# Patient Record
Sex: FEMALE | URBAN - METROPOLITAN AREA
[De-identification: names, ages, dates, MRNs, and addresses within clinical notes are randomized per-mention and may not be internally consistent; named-entity substitution may affect disease eponyms.]

---

## 2019-01-08 ENCOUNTER — OFFICE (OUTPATIENT)
Dept: URBAN - METROPOLITAN AREA CLINIC 66 | Facility: CLINIC | Age: 29
End: 2019-01-08

## 2019-01-08 VITALS
HEIGHT: 59 IN | WEIGHT: 90 LBS | SYSTOLIC BLOOD PRESSURE: 110 MMHG | DIASTOLIC BLOOD PRESSURE: 76 MMHG | HEART RATE: 72 BPM

## 2019-01-08 DIAGNOSIS — K21.9 GASTRO-ESOPHAGEAL REFLUX DISEASE WITHOUT ESOPHAGITIS: ICD-10-CM

## 2019-01-08 DIAGNOSIS — K30 FUNCTIONAL DYSPEPSIA: ICD-10-CM

## 2019-01-08 DIAGNOSIS — R10.13 EPIGASTRIC PAIN: ICD-10-CM

## 2019-01-08 PROCEDURE — 99242 OFF/OP CONSLTJ NEW/EST SF 20: CPT | Performed by: INTERNAL MEDICINE

## 2021-01-04 ENCOUNTER — IMMUNIZATION (OUTPATIENT)
Dept: VACCINE CLINIC | Facility: HOSPITAL | Age: 31
End: 2021-01-04

## 2021-01-04 PROCEDURE — 91300 HC SARSCOV02 VAC 30MCG/0.3ML IM: CPT | Performed by: INTERNAL MEDICINE

## 2021-01-04 PROCEDURE — 0001A: CPT | Performed by: INTERNAL MEDICINE

## 2021-01-25 ENCOUNTER — IMMUNIZATION (OUTPATIENT)
Dept: VACCINE CLINIC | Facility: HOSPITAL | Age: 31
End: 2021-01-25

## 2021-01-25 PROCEDURE — 91300 HC SARSCOV02 VAC 30MCG/0.3ML IM: CPT | Performed by: INTERNAL MEDICINE

## 2021-01-25 PROCEDURE — 0002A: CPT | Performed by: INTERNAL MEDICINE

## 2021-04-02 ENCOUNTER — TRANSCRIBE ORDERS (OUTPATIENT)
Dept: ADMINISTRATIVE | Facility: HOSPITAL | Age: 31
End: 2021-04-02

## 2021-04-02 ENCOUNTER — LAB (OUTPATIENT)
Dept: LAB | Facility: HOSPITAL | Age: 31
End: 2021-04-02

## 2021-04-02 DIAGNOSIS — Z83.49 FAMILY HISTORY OF ENDOCRINE AND METABOLIC DISEASE: ICD-10-CM

## 2021-04-02 DIAGNOSIS — Z00.00 ROUTINE GENERAL MEDICAL EXAMINATION AT A HEALTH CARE FACILITY: ICD-10-CM

## 2021-04-02 DIAGNOSIS — Z00.00 ROUTINE GENERAL MEDICAL EXAMINATION AT A HEALTH CARE FACILITY: Primary | ICD-10-CM

## 2021-04-02 LAB
ALBUMIN SERPL-MCNC: 4.3 G/DL (ref 3.5–5.2)
ALBUMIN/GLOB SERPL: 1.2 G/DL
ALP SERPL-CCNC: 54 U/L (ref 39–117)
ALT SERPL W P-5'-P-CCNC: 27 U/L (ref 1–33)
ANION GAP SERPL CALCULATED.3IONS-SCNC: 10.5 MMOL/L (ref 5–15)
AST SERPL-CCNC: 24 U/L (ref 1–32)
BILIRUB SERPL-MCNC: 0.2 MG/DL (ref 0–1.2)
BUN SERPL-MCNC: 14 MG/DL (ref 6–20)
BUN/CREAT SERPL: 24.6 (ref 7–25)
CALCIUM SPEC-SCNC: 9 MG/DL (ref 8.6–10.5)
CHLORIDE SERPL-SCNC: 104 MMOL/L (ref 98–107)
CHOLEST SERPL-MCNC: 159 MG/DL (ref 0–200)
CO2 SERPL-SCNC: 20.5 MMOL/L (ref 22–29)
CREAT SERPL-MCNC: 0.57 MG/DL (ref 0.57–1)
GFR SERPL CREATININE-BSD FRML MDRD: 125 ML/MIN/1.73
GFR SERPL CREATININE-BSD FRML MDRD: >150 ML/MIN/1.73
GLOBULIN UR ELPH-MCNC: 3.5 GM/DL
GLUCOSE SERPL-MCNC: 82 MG/DL (ref 65–99)
HDLC SERPL-MCNC: 41 MG/DL (ref 40–60)
LDLC SERPL CALC-MCNC: 101 MG/DL (ref 0–100)
LDLC/HDLC SERPL: 2.44 {RATIO}
POTASSIUM SERPL-SCNC: 4.4 MMOL/L (ref 3.5–5.2)
PROT SERPL-MCNC: 7.8 G/DL (ref 6–8.5)
SODIUM SERPL-SCNC: 135 MMOL/L (ref 136–145)
TRIGL SERPL-MCNC: 89 MG/DL (ref 0–150)
TSH SERPL DL<=0.05 MIU/L-ACNC: 1.72 UIU/ML (ref 0.27–4.2)
VLDLC SERPL-MCNC: 17 MG/DL (ref 5–40)

## 2021-04-02 PROCEDURE — 84443 ASSAY THYROID STIM HORMONE: CPT

## 2021-04-02 PROCEDURE — 80053 COMPREHEN METABOLIC PANEL: CPT

## 2021-04-02 PROCEDURE — 80061 LIPID PANEL: CPT

## 2021-04-02 PROCEDURE — 36415 COLL VENOUS BLD VENIPUNCTURE: CPT

## 2024-06-25 ENCOUNTER — OFFICE VISIT (OUTPATIENT)
Dept: FAMILY MEDICINE CLINIC | Facility: CLINIC | Age: 34
End: 2024-06-25
Payer: COMMERCIAL

## 2024-06-25 VITALS
RESPIRATION RATE: 16 BRPM | DIASTOLIC BLOOD PRESSURE: 82 MMHG | SYSTOLIC BLOOD PRESSURE: 106 MMHG | OXYGEN SATURATION: 99 % | TEMPERATURE: 98.2 F | HEIGHT: 59 IN | HEART RATE: 72 BPM | BODY MASS INDEX: 20.84 KG/M2 | WEIGHT: 103.4 LBS

## 2024-06-25 DIAGNOSIS — H57.9 ITCHY EYES: ICD-10-CM

## 2024-06-25 DIAGNOSIS — Z11.3 SCREEN FOR STD (SEXUALLY TRANSMITTED DISEASE): Primary | ICD-10-CM

## 2024-06-25 PROCEDURE — 99203 OFFICE O/P NEW LOW 30 MIN: CPT | Performed by: FAMILY MEDICINE

## 2024-06-25 RX ORDER — DIPHENOXYLATE HYDROCHLORIDE AND ATROPINE SULFATE 2.5; .025 MG/1; MG/1
TABLET ORAL
COMMUNITY

## 2024-06-25 RX ORDER — KETOTIFEN FUMARATE 0.35 MG/ML
1 SOLUTION/ DROPS OPHTHALMIC 2 TIMES DAILY
Qty: 10 ML | Refills: 1 | Status: SHIPPED | OUTPATIENT
Start: 2024-06-25

## 2024-06-25 NOTE — PROGRESS NOTES
Subjective     Annette Jaffe is a 33 y.o. female.     Chief Complaint   Patient presents with    Establish [facility]    STD test    eye itching     X 3 weeks both eyes.        History of Present Illness     To establish care, discuss the followings ;      Works WITH      She is allergic to tylenol and Ibu  Was advised to take PPI before Ibu     She is sexually active   Had sex with new partner   Using condom   Has regular cycle   Not on BC    C/o Itchy eyes with No discharge          The following portions of the patient's history were reviewed and updated as appropriate: allergies, current medications, past family history, past medical history, past social history, past surgical history, and problem list.        Review of Systems   Eyes:  Positive for itching. Negative for discharge.       Vitals:    06/25/24 1558   BP: 106/82   Pulse: 72   Resp: 16   Temp: 98.2 °F (36.8 °C)   SpO2: 99%           06/25/24  1558   Weight: 46.9 kg (103 lb 6.4 oz)         Body mass index is 20.88 kg/m².      Current Outpatient Medications   Medication Sig Dispense Refill    multivitamin (THERAGRAN) tablet tablet Take  by mouth.      Ketotifen Fumarate (ZADITOR) 0.035 % solution Administer 1 drop to both eyes 2 (Two) Times a Day. 10 mL 1     No current facility-administered medications for this visit.                Objective   Physical Exam  Vitals and nursing note reviewed.   Constitutional:       General: She is not in acute distress.     Appearance: She is not toxic-appearing.   Eyes:      Extraocular Movements: Extraocular movements intact.      Conjunctiva/sclera: Conjunctivae normal.      Pupils: Pupils are equal, round, and reactive to light.   Skin:     General: Skin is warm.   Neurological:      Mental Status: She is alert and oriented to person, place, and time.   Psychiatric:         Mood and Affect: Mood normal.         Behavior: Behavior normal.         Thought Content: Thought content normal.           Assessment & Plan    Diagnoses and all orders for this visit:    1. Screen for STD (sexually transmitted disease) (Primary)  -     Chlamydia trachomatis, Neisseria gonorrhoeae, Trichomonas vaginalis, PCR - Urine, Urine, Clean Catch; Future  -     HIV-1 / O / 2 Ag / Antibody; Future  -     RPR; Future    2. Itchy eyes  -     Ketotifen Fumarate (ZADITOR) 0.035 % solution; Administer 1 drop to both eyes 2 (Two) Times a Day.  Dispense: 10 mL; Refill: 1           I spent 33 minutes caring for this patient on this date of service. This time includes time spent by me in the following activities:preparing for the visit,reviewing previous medical records,  performing a medically appropriate examination and/or evaluation, counseling and educating the patient/family/caregiver, and documenting information in the medical record.         Patient was given instructions and counseling regarding her condition or for health maintenance advice. Please see specific information pulled into the AVS if appropriate.     I have fully discussed the nature of the medical condition(s) risks, complications, management, safe and proper use of medications.   Encouraged medication compliance and the importance of keeping scheduled follow up appointments with me and any other providers.    Patient instructed to follow up with our office for results on any labs/imaging ordered during this visit.    Home care discussed  All questions answered  Patient verbalizes understanding and agrees to treatment plan.     Follow up: Return for WILL CALL YOU FOR LBAS/XR RESULT.

## 2024-06-27 ENCOUNTER — LAB (OUTPATIENT)
Dept: LAB | Facility: HOSPITAL | Age: 34
End: 2024-06-27
Payer: COMMERCIAL

## 2024-06-27 PROCEDURE — G0432 EIA HIV-1/HIV-2 SCREEN: HCPCS | Performed by: FAMILY MEDICINE

## 2024-06-27 PROCEDURE — 86592 SYPHILIS TEST NON-TREP QUAL: CPT | Performed by: FAMILY MEDICINE

## 2024-06-27 PROCEDURE — 87661 TRICHOMONAS VAGINALIS AMPLIF: CPT | Performed by: FAMILY MEDICINE

## 2024-06-27 PROCEDURE — 87591 N.GONORRHOEAE DNA AMP PROB: CPT | Performed by: FAMILY MEDICINE

## 2024-06-27 PROCEDURE — 87491 CHLMYD TRACH DNA AMP PROBE: CPT | Performed by: FAMILY MEDICINE

## 2024-07-01 ENCOUNTER — TELEPHONE (OUTPATIENT)
Dept: FAMILY MEDICINE CLINIC | Facility: CLINIC | Age: 34
End: 2024-07-01
Payer: COMMERCIAL

## 2024-07-01 NOTE — TELEPHONE ENCOUNTER
Hub staff attempted to follow warm transfer process and was unsuccessful     Caller: Annette Jaffe    Relationship to patient: Self    Best call back number: [phone]    Patient is needing: PT CALLING TO SCHEDULE APPT FOR VACCINE. STATES SHE WANTS THE HSV-1 VACCINE.

## 2024-07-02 ENCOUNTER — PATIENT ROUNDING (BHMG ONLY) (OUTPATIENT)
Dept: FAMILY MEDICINE CLINIC | Facility: CLINIC | Age: 34
End: 2024-07-02
Payer: COMMERCIAL

## 2024-07-02 NOTE — TELEPHONE ENCOUNTER
I do not think her insurance  will cover it due to her age   She needs to contact her insurance first

## 2024-07-02 NOTE — TELEPHONE ENCOUNTER
Advised patient to contact local pharmacy for HSV-1 vaccine. Her partner has the herpes virus, patient was recently tested and is negative. Patient would like to know from clinical if the HSV-1 vaccine will prevent her for sher the herpes virus and/or what precautions should she take? Please advise.

## 2024-07-02 NOTE — PROGRESS NOTES
July 2, 2024    Hello, may I speak with Annette Kempton?    My name is Leeanna Durham      [facility] am  [facility]  [address] [place] [id number] [phone].    Before we get started may I verify your date of birth? 1990    I am calling to officially welcome you to our practice and ask about your recent visit. Is this a good time to talk? No, patient message sent

## 2024-07-15 ENCOUNTER — OFFICE VISIT (OUTPATIENT)
Dept: FAMILY MEDICINE CLINIC | Facility: CLINIC | Age: 34
End: 2024-07-15
Payer: COMMERCIAL

## 2024-07-15 VITALS
WEIGHT: 98.8 LBS | BODY MASS INDEX: 19.92 KG/M2 | OXYGEN SATURATION: 98 % | HEIGHT: 59 IN | SYSTOLIC BLOOD PRESSURE: 116 MMHG | DIASTOLIC BLOOD PRESSURE: 68 MMHG | HEART RATE: 84 BPM | RESPIRATION RATE: 16 BRPM | TEMPERATURE: 97 F

## 2024-07-15 DIAGNOSIS — Z11.59 NEED FOR HEPATITIS C SCREENING TEST: ICD-10-CM

## 2024-07-15 DIAGNOSIS — Z23 NEED FOR TDAP VACCINATION: ICD-10-CM

## 2024-07-15 DIAGNOSIS — Z00.00 ENCOUNTER FOR ANNUAL PHYSICAL EXAM: Primary | ICD-10-CM

## 2024-07-15 DIAGNOSIS — R59.0 LYMPHADENOPATHY, CERVICAL: ICD-10-CM

## 2024-07-15 DIAGNOSIS — R74.8 ELEVATED LIVER ENZYMES: ICD-10-CM

## 2024-07-15 PROCEDURE — 99214 OFFICE O/P EST MOD 30 MIN: CPT | Performed by: FAMILY MEDICINE

## 2024-07-15 PROCEDURE — 99395 PREV VISIT EST AGE 18-39: CPT | Performed by: FAMILY MEDICINE

## 2024-07-15 NOTE — PROGRESS NOTES
Patient Care Team:  Obed Cameron MD as PCP - General (Urgent Care)     Chief complaint: Patient is in today for a physical          Patient is a 33 y.o. female who presents for her yearly physical exam.     HPI     Works WITH    She is always c/o on/off enlarged Lt side cervical LN , she stated if she works to many Hrs or lifting heavy things , make it worse.     Eating HD .   Not Exercising routinely.   Immunizations: reviewed and discussed.       Health maintenance/lifestyle:  Immunization History   Administered Date(s) Administered    COVID-19 (PFIZER) Purple Cap Monovalent 01/04/2021, 01/25/2021    Flublok 18+yrs 10/18/2023    Fluzone (or Fluarix & Flulaval for VFC) >6mos 10/08/2017, 10/09/2018, 10/16/2021    Hepatitis A 05/02/2018    MMR 10/23/2013    PPD Test 01/09/2018    Tdap 10/23/2013         HM;  Colorectal Screening:  Start at 45.  Pap:  will see GYN in Sep/2024          Social History     Tobacco Use   Smoking Status Never   Smokeless Tobacco Never     Social History     Substance and Sexual Activity   Alcohol Use Never         Review of Systems   HENT:  Positive for swollen glands.          History  History reviewed. No pertinent past medical history.   History reviewed. No pertinent surgical history.   Allergies   Allergen Reactions    Acetaminophen Rash     numbness    Ibuprofen Rash     Stomach irritation    Omeprazole Rash     Bloating and itchy    Sulfamethoxazole-Trimethoprim Rash      Family History   Problem Relation Age of Onset    No Known Problems Mother     No Known Problems Father      Social History     Socioeconomic History    Marital status:    Tobacco Use    Smoking status: Never    Smokeless tobacco: Never   Vaping Use    Vaping status: Never Used   Substance and Sexual Activity    Alcohol use: Never    Drug use: Never        Current Outpatient Medications:     multivitamin (THERAGRAN) tablet tablet, Take  by mouth., Disp: , Rfl:                   /68   Pulse 84   " Temp 97 °F (36.1 °C)   Resp 16   Ht 149.9 cm (59.02\")   Wt 44.8 kg (98 lb 12.8 oz)   LMP 07/03/2024 (Exact Date)   SpO2 98%   BMI 19.94 kg/m²       Physical Exam  Vitals and nursing note reviewed.   Constitutional:       General: She is not in acute distress.     Appearance: She is not toxic-appearing.   HENT:      Mouth/Throat:      Mouth: Mucous membranes are moist.   Eyes:      Conjunctiva/sclera: Conjunctivae normal.   Cardiovascular:      Rate and Rhythm: Normal rate and regular rhythm.      Heart sounds: Normal heart sounds. No murmur heard.  Pulmonary:      Effort: Pulmonary effort is normal. No respiratory distress.      Breath sounds: Normal breath sounds. No stridor. No wheezing or rhonchi.   Abdominal:      General: Bowel sounds are normal. There is no distension.      Palpations: Abdomen is soft. There is no mass.      Tenderness: There is no abdominal tenderness. There is no guarding or rebound.      Hernia: No hernia is present.   Musculoskeletal:      Cervical back: Neck supple.   Lymphadenopathy:      Cervical: Cervical adenopathy present.   Skin:     General: Skin is warm.      Coloration: Skin is not jaundiced.   Neurological:      General: No focal deficit present.      Mental Status: She is alert and oriented to person, place, and time.   Psychiatric:         Mood and Affect: Mood normal.         Behavior: Behavior normal.         Thought Content: Thought content normal.                   Diagnoses and all orders for this visit:    1. Encounter for annual physical exam (Primary)  -     CBC (No Diff)  -     Lipid Panel  -     Comprehensive Metabolic Panel    2. Lymphadenopathy, cervical  Comments:  new problem, needs w/u  Orders:  -     US Head Neck Soft Tissue; Future    3. Need for Tdap vaccination  -     Tdap Vaccine => 6yo IM (BOOSTRIX/ADACEL)    4. Need for hepatitis C screening test  -     Hepatitis C Antibody      -Age and sex appropriate physical exam performed and documented. "   -Updated past medical, family, social and surgical histories as well as allergies and care team list.   -Addressed care gaps listed in the medical record.  -advised to eat healthy diet, do daily exercise   - discussed and updates preventive screening measures       Follow up: Return in about 1 year (around 7/15/2025) for physical.  Plan of care discussed with pt. They verbalized understanding and agreement.     Obed Cameron MD   7/15/2024   12:18 EDT           Addendum;  Labs has been ordered and personally/independently interpreted , discussed with pt      showed mild elevation in choles, needs to eat HD  Her liver enzy elevated , not sure why, needs further w/u  US and other labs ordered   Advise to avoid tylenol       Lab Results   Component Value Date    GLUCOSE 107 (H) 07/15/2024    BUN 14 07/15/2024    CREATININE 0.67 07/15/2024    EGFRRESULT 119 07/15/2024    BCR 21 07/15/2024    K 4.3 07/15/2024    CO2 23 07/15/2024    CALCIUM 9.9 07/15/2024    PROTENTOTREF 8.2 07/15/2024    ALBUMIN 4.1 07/15/2024    BILITOT 0.4 07/15/2024     (H) 07/15/2024     (H) 07/15/2024     Lab Results   Component Value Date    CHOL 159 04/02/2021    CHLPL 180 07/15/2024    TRIG 179 (H) 07/15/2024    HDL 23 (L) 07/15/2024     (H) 07/15/2024     Lab Results   Component Value Date    WBC 3.8 07/15/2024    HGB 11.5 07/15/2024    HCT 35.5 07/15/2024    MCV 87 07/15/2024     07/15/2024

## 2024-07-16 LAB
ALBUMIN SERPL-MCNC: 4.1 G/DL (ref 3.9–4.9)
ALP SERPL-CCNC: 112 IU/L (ref 44–121)
ALT SERPL-CCNC: 126 IU/L (ref 0–32)
AST SERPL-CCNC: 141 IU/L (ref 0–40)
BILIRUB SERPL-MCNC: 0.4 MG/DL (ref 0–1.2)
BUN SERPL-MCNC: 14 MG/DL (ref 6–20)
BUN/CREAT SERPL: 21 (ref 9–23)
CALCIUM SERPL-MCNC: 9.9 MG/DL (ref 8.7–10.2)
CHLORIDE SERPL-SCNC: 99 MMOL/L (ref 96–106)
CHOLEST SERPL-MCNC: 180 MG/DL (ref 100–199)
CO2 SERPL-SCNC: 23 MMOL/L (ref 20–29)
CREAT SERPL-MCNC: 0.67 MG/DL (ref 0.57–1)
EGFRCR SERPLBLD CKD-EPI 2021: 119 ML/MIN/1.73
ERYTHROCYTE [DISTWIDTH] IN BLOOD BY AUTOMATED COUNT: 13 % (ref 11.7–15.4)
GLOBULIN SER CALC-MCNC: 4.1 G/DL (ref 1.5–4.5)
GLUCOSE SERPL-MCNC: 107 MG/DL (ref 70–99)
HCT VFR BLD AUTO: 35.5 % (ref 34–46.6)
HCV IGG SERPL QL IA: NON REACTIVE
HDLC SERPL-MCNC: 23 MG/DL
HGB BLD-MCNC: 11.5 G/DL (ref 11.1–15.9)
LDLC SERPL CALC-MCNC: 125 MG/DL (ref 0–99)
MCH RBC QN AUTO: 28.1 PG (ref 26.6–33)
MCHC RBC AUTO-ENTMCNC: 32.4 G/DL (ref 31.5–35.7)
MCV RBC AUTO: 87 FL (ref 79–97)
PLATELET # BLD AUTO: 214 X10E3/UL (ref 150–450)
POTASSIUM SERPL-SCNC: 4.3 MMOL/L (ref 3.5–5.2)
PROT SERPL-MCNC: 8.2 G/DL (ref 6–8.5)
RBC # BLD AUTO: 4.09 X10E6/UL (ref 3.77–5.28)
SODIUM SERPL-SCNC: 134 MMOL/L (ref 134–144)
TRIGL SERPL-MCNC: 179 MG/DL (ref 0–149)
VLDLC SERPL CALC-MCNC: 32 MG/DL (ref 5–40)
WBC # BLD AUTO: 3.8 X10E3/UL (ref 3.4–10.8)

## 2024-07-29 ENCOUNTER — HOSPITAL ENCOUNTER (OUTPATIENT)
Dept: ULTRASOUND IMAGING | Facility: HOSPITAL | Age: 34
Discharge: HOME | End: 2024-07-29
Payer: COMMERCIAL

## 2024-07-29 DIAGNOSIS — R59.0 LYMPHADENOPATHY, CERVICAL: ICD-10-CM

## 2024-07-29 DIAGNOSIS — R74.8 ELEVATED LIVER ENZYMES: ICD-10-CM

## 2024-07-29 PROCEDURE — 76705 ECHO EXAM OF ABDOMEN: CPT

## 2024-07-29 PROCEDURE — 76536 US EXAM OF HEAD AND NECK: CPT

## 2024-08-06 DIAGNOSIS — R93.89 ABNORMAL ULTRASOUND OF NECK: ICD-10-CM

## 2024-08-06 DIAGNOSIS — R59.0 LYMPHADENOPATHY, CERVICAL: Primary | ICD-10-CM

## 2024-08-08 ENCOUNTER — TRANSCRIBE ORDERS (OUTPATIENT)
Dept: ADMINISTRATIVE | Facility: HOSPITAL | Age: 34
End: 2024-08-08
Payer: COMMERCIAL

## 2024-08-08 DIAGNOSIS — K76.9 LIVER LESION: Primary | ICD-10-CM

## 2024-11-12 ENCOUNTER — HOSPITAL ENCOUNTER (OUTPATIENT)
Dept: ULTRASOUND IMAGING | Facility: HOSPITAL | Age: 34
Discharge: HOME | End: 2024-11-12
Admitting: FAMILY MEDICINE
Payer: COMMERCIAL

## 2024-11-12 DIAGNOSIS — K76.9 LIVER LESION: ICD-10-CM

## 2024-11-12 PROCEDURE — 76705 ECHO EXAM OF ABDOMEN: CPT

## 2024-11-18 ENCOUNTER — TELEPHONE (OUTPATIENT)
Dept: FAMILY MEDICINE CLINIC | Facility: CLINIC | Age: 34
End: 2024-11-18

## 2024-11-18 NOTE — TELEPHONE ENCOUNTER
Caller: Annette Jaffe    Relationship: Self    Best call back number:     [phone]       Caller requesting test results: EBD ULTRA SOUND    What test was performed: ULTRA SOUND    When was the test performed: LAST WEEK    Where was the test performed: [facility]    Additional notes:     PATIENT IS WANTING SOMEONE TO GIVE HER CALL AND GO OVER THE TEST RESULTS WITH HER.  PATIENT STATED THAT THE PERSON THAT ORDERED THE TEST HAS RETIRED.

## 2024-11-19 NOTE — TELEPHONE ENCOUNTER
US showed per report;  Stable 3.6 cm right lobe hepatic mass compared to 07/29/2024 , most likely benign hemangioma.   There is a second smaller, 0.9 cm hyperechoic lesion within the left lobe of the liver that was not clearly evident previously though has the appearance of a small hemangioma and this could be followed on subsequent exam to evaluate for stability in 4-6 months or further evaluated with multiphasic CT or MRI.

## 2024-11-25 ENCOUNTER — OFFICE VISIT (OUTPATIENT)
Dept: FAMILY MEDICINE CLINIC | Facility: CLINIC | Age: 34
End: 2024-11-25
Payer: COMMERCIAL

## 2024-11-25 VITALS
DIASTOLIC BLOOD PRESSURE: 68 MMHG | RESPIRATION RATE: 16 BRPM | BODY MASS INDEX: 20.96 KG/M2 | TEMPERATURE: 96.9 F | SYSTOLIC BLOOD PRESSURE: 96 MMHG | WEIGHT: 104 LBS | HEIGHT: 59 IN | OXYGEN SATURATION: 97 % | HEART RATE: 75 BPM

## 2024-11-25 DIAGNOSIS — K76.89 LIVER CYST: ICD-10-CM

## 2024-11-25 DIAGNOSIS — R59.0 LYMPHADENOPATHY, CERVICAL: ICD-10-CM

## 2024-11-25 DIAGNOSIS — R74.8 ELEVATED LIVER ENZYMES: Primary | ICD-10-CM

## 2024-11-25 DIAGNOSIS — E78.5 DYSLIPIDEMIA: ICD-10-CM

## 2024-11-25 PROCEDURE — 99214 OFFICE O/P EST MOD 30 MIN: CPT | Performed by: FAMILY MEDICINE

## 2024-11-25 NOTE — PROGRESS NOTES
Subjective     Annette Jaffe is a 34 y.o. female.     Chief Complaint   Patient presents with    Imaging Only     Discuss U/S     Edema     In neck , started Thursday., body ache headache.        History of Present Illness     She works at      Presents for FU On the followings;     Chronic, recurrent  Intermittent cervical LAD, had it on the Rt side which resolved, now she noticed it on the Lt side and under the chin.  for 4 days ago, got bigger , hurts to touch , hard to swallow   No bruising     HLD; labs showed elevated LDL and TG. She eats HD , do regular exercise     Liver lesions with Elevated LFTs ; Us showed 3.6 cm echogenic hepatic lesion which favors hemangioma but needs confirmation with multiphase CT or contrast MRI vs short term FU US in 3-6 mo. US completed 7/29/24.   Denies h/o J  Her  has Hep C , was Rx  She had neg Hep C screening test result    US Abdomen Limited (11/12/2024 07:34)   IMPRESSION:  FINDINGS AND IMPRESSION:  More than typically seen mildly enlarged and prominent nodes are present  within the left neck the area of interest as indicated by the patient  as. The nodes maintain an echogenic michael and ovoid shape. Index nodes  measure up to 2.8 x 2 x 0.5 cm..in while findings may be reactive, the remain indeterminate and will  require follow-up to ensure stability and/or resolution. Given the  technical nature of ultrasound, further evaluation with CT of the neck  with intravenous contrast is recommended to better characterize and  continue to follow these nodes.         Lab Results   Component Value Date    GLUCOSE 107 (H) 07/15/2024    BUN 14 07/15/2024    CREATININE 0.67 07/15/2024     07/15/2024    K 4.3 07/15/2024    CL 99 07/15/2024    CALCIUM 9.9 07/15/2024    PROTEINTOT 7.8 04/02/2021    ALBUMIN 4.1 07/15/2024     (H) 07/15/2024     (H) 07/15/2024    ALKPHOS 112 07/15/2024    BILITOT 0.4 07/15/2024    GLOB 3.5 04/02/2021    AGRATIO 1.2 04/02/2021    BCR  21 07/15/2024    ANIONGAP 10.5 04/02/2021       Lab Results   Component Value Date    CHOL 159 04/02/2021    CHLPL 180 07/15/2024    TRIG 179 (H) 07/15/2024    HDL 23 (L) 07/15/2024     (H) 07/15/2024       The following portions of the patient's history were reviewed and updated as appropriate: allergies, current medications, past family history, past medical history, past social history, past surgical history, and problem list.        Review of Systems   Gastrointestinal:  Negative for abdominal pain.       Vitals:    11/25/24 0901   BP: 96/68   Pulse: 75   Resp: 16   Temp: 96.9 °F (36.1 °C)   SpO2: 97%           11/25/24 0901   Weight: 47.2 kg (104 lb)         Body mass index is 20.99 kg/m².      Current Outpatient Medications   Medication Sig Dispense Refill    multivitamin (THERAGRAN) tablet tablet Take  by mouth.       No current facility-administered medications for this visit.                Objective   Physical Exam  Vitals and nursing note reviewed.   Constitutional:       General: She is not in acute distress.     Appearance: She is not toxic-appearing.   Neck:      Comments: Enlarged LN on the Lt side of the neck and under the chin   Abdominal:      General: Bowel sounds are normal. There is no distension.      Palpations: Abdomen is soft. There is no mass.      Tenderness: There is no abdominal tenderness. There is no guarding or rebound.      Hernia: No hernia is present.   Neurological:      Mental Status: She is alert and oriented to person, place, and time.   Psychiatric:         Mood and Affect: Mood normal.         Behavior: Behavior normal.         Thought Content: Thought content normal.           Assessment & Plan   Diagnoses and all orders for this visit:    1. Elevated liver enzymes (Primary)  Comments:  consider MRI liver or GI referal if still LFT elevated  Orders:  -     Comprehensive Metabolic Panel  -     CBC (No Diff)  -     Hepatitis B surface antigen  -     Hepatitis B surface  antibody  -     Hepatitis B core antibody, total  -     Hepatitis A antibody, total    2. Liver cyst  -     Comprehensive Metabolic Panel    3. Lymphadenopathy, cervical  Comments:  need US  Orders:  -     US Head Neck Soft Tissue; Future    4. Dyslipidemia  Comments:  discussed diet        Patient was given instructions and counseling regarding her condition or for health maintenance advice. Please see specific information pulled into the AVS if appropriate.       I have fully discussed the nature of the medical condition(s) risks, complications, management, safe and proper use of medications.   Encouraged medication compliance and the importance of keeping scheduled follow up appointments with me and any other providers.    Patient instructed to follow up with our office for results on any labs/imaging ordered during this visit.    Home care discussed  All questions answered  Patient verbalizes understanding and agrees to treatment plan.     Follow up: Return in about 8 weeks (around 1/22/2025) for follow up on current illness.         Ok with [facility] for labs results

## 2024-11-26 LAB
ALBUMIN SERPL-MCNC: 4.1 G/DL (ref 3.9–4.9)
ALP SERPL-CCNC: 54 IU/L (ref 44–121)
ALT SERPL-CCNC: 11 IU/L (ref 0–32)
AST SERPL-CCNC: 17 IU/L (ref 0–40)
BILIRUB SERPL-MCNC: 0.2 MG/DL (ref 0–1.2)
BUN SERPL-MCNC: 11 MG/DL (ref 6–20)
BUN/CREAT SERPL: 14 (ref 9–23)
CALCIUM SERPL-MCNC: 9 MG/DL (ref 8.7–10.2)
CHLORIDE SERPL-SCNC: 105 MMOL/L (ref 96–106)
CO2 SERPL-SCNC: 25 MMOL/L (ref 20–29)
CREAT SERPL-MCNC: 0.8 MG/DL (ref 0.57–1)
EGFRCR SERPLBLD CKD-EPI 2021: 100 ML/MIN/1.73
ERYTHROCYTE [DISTWIDTH] IN BLOOD BY AUTOMATED COUNT: 11.8 % (ref 11.7–15.4)
GLOBULIN SER CALC-MCNC: 3.7 G/DL (ref 1.5–4.5)
GLUCOSE SERPL-MCNC: 86 MG/DL (ref 70–99)
HAV AB SER QL IA: POSITIVE
HBV CORE AB SERPL QL IA: NEGATIVE
HBV SURFACE AB SER QL: REACTIVE
HBV SURFACE AG SERPL QL IA: NEGATIVE
HCT VFR BLD AUTO: 34.3 % (ref 34–46.6)
HGB BLD-MCNC: 11 G/DL (ref 11.1–15.9)
MCH RBC QN AUTO: 28.4 PG (ref 26.6–33)
MCHC RBC AUTO-ENTMCNC: 32.1 G/DL (ref 31.5–35.7)
MCV RBC AUTO: 88 FL (ref 79–97)
PLATELET # BLD AUTO: 267 X10E3/UL (ref 150–450)
POTASSIUM SERPL-SCNC: 4.1 MMOL/L (ref 3.5–5.2)
PROT SERPL-MCNC: 7.8 G/DL (ref 6–8.5)
RBC # BLD AUTO: 3.88 X10E6/UL (ref 3.77–5.28)
SODIUM SERPL-SCNC: 139 MMOL/L (ref 134–144)
WBC # BLD AUTO: 3.5 X10E3/UL (ref 3.4–10.8)